# Patient Record
(demographics unavailable — no encounter records)

---

## 2024-12-19 NOTE — CARDIOLOGY SUMMARY
[de-identified] : 12/19/24: sinus rhythm [de-identified] : TTE 11/10/2024  CONCLUSIONS:  1. Normal right ventricular cavity size and probably normal right ventricular systolic function. Tricuspid annular plane systolic excursion (TAPSE) is 1.9 cm (normal >=1.7 cm).  2. Left atrium is normal in size.  3. The right atrium is normal in size.  4. No pericardial effusion seen.  5. LV/RV fuinction should be re evaluated after rhythm or rate control.  6. Analysis of left ventricular diastolic function and filling pressure is made challenging by the presence of tachycardia.  7. Left ventricular endocardium is not well visualized; however, the left ventricular systolic function appears grossly normal.  8. No prior echocardiogram is available for comparison.  9. Technically difficult image quality. 10. The inferior vena cava is normal in size measuring 1.03 cm in diameter, (normal <2.1cm) with normal inspiratory collapse (normal >50%) consistent with normal right atrial pressure (\R\3, range 0-5mmHg). 11. There is no evidence of a left ventricular thromb

## 2024-12-19 NOTE — HISTORY OF PRESENT ILLNESS
[FreeTextEntry1] : Shawn Andrews is a 39-year-old man with WPW s/p ablation (1999, ?R sided) and HTN. He presented on 11/8/24 with dizziness and lightheadedness in the setting of two weeks of symptomatic palpitations. He was found to have episodes of narrow complex tachycardia in the ED with ventricular rates in the 160s. He was initially admitted to the floors before being stepped up to the CCU after a rapid response was called for sustained tachycardia nonresponsive to beta blocker. Adenosine was administered multiple times which terminated the tachycardia with reinitiation. He was then started on a procainamide infusion and admitted to the CICU. Telemetry revealed episodes of narrow complex tachycardia that started and stopped with PVCs. Procainamide was transitioned to flecainide, which failed to suppress his arrhythmia. His TTE from 11/10/24 in tachycardia shows grossly normal LV function, normal RV size w/probably normal function, normal biatrial size, trace MR, trace TR, trace PI and no pericardial effusion. He presented to the EP lab 11/12/2024 for an EP study, during which orthodromic reciprocating tachycardia utilizing a left anterolateral pathway (2 o'clock on the mitral annulus) was induced. He underwent successful ablation of the pathway and was discharged on a 30 day course of Eliquis 5mg bid.   Since his procedure, the patient feels well. Right groin without pain, swelling, or bruising. He denies any chest pain, palpitations, SOB, dizziness, syncope or near syncope. He notes that he discovered in the hospital that the reason he'd wake up after a night of sleep tired and diaphoretic was that he would go in to tachycardia. Post procedure he has awoken in the morning refreshed without any diaphoresis.

## 2024-12-19 NOTE — CARDIOLOGY SUMMARY
[de-identified] : 12/19/24: sinus rhythm [de-identified] : TTE 11/10/2024  CONCLUSIONS:  1. Normal right ventricular cavity size and probably normal right ventricular systolic function. Tricuspid annular plane systolic excursion (TAPSE) is 1.9 cm (normal >=1.7 cm).  2. Left atrium is normal in size.  3. The right atrium is normal in size.  4. No pericardial effusion seen.  5. LV/RV fuinction should be re evaluated after rhythm or rate control.  6. Analysis of left ventricular diastolic function and filling pressure is made challenging by the presence of tachycardia.  7. Left ventricular endocardium is not well visualized; however, the left ventricular systolic function appears grossly normal.  8. No prior echocardiogram is available for comparison.  9. Technically difficult image quality. 10. The inferior vena cava is normal in size measuring 1.03 cm in diameter, (normal <2.1cm) with normal inspiratory collapse (normal >50%) consistent with normal right atrial pressure (\R\3, range 0-5mmHg). 11. There is no evidence of a left ventricular thromb

## 2024-12-19 NOTE — DISCUSSION/SUMMARY
[EKG obtained to assist in diagnosis and management of assessed problem(s)] : EKG obtained to assist in diagnosis and management of assessed problem(s) [FreeTextEntry1] : Impressions:  1. AVRT: s/p left anterolateral pathway ablation on 11/12/24.  EKG performed today to assess for presence of conduction disease and reveals sinus rhythm with no evidence of pre-excitation. Mr. Andrews is feeling well post ablation. He has completed 30 days of Eliquis 5mg bid post ablation. He does not need to continue on any antiarrhythmics or AV talat agents. No further EP testing or intervention indicated.    2.  HTN: currently is on amlodipine 10mg QD and was started on metoprolol for additional BP control. Spoke with patient about discussing with PCP about switching metoprolol to a class I antihypertensive such as lisinopril or enalapril.Encouraged heart healthy diet, sodium restriction, and weight loss. Continue regular f/u with Cardiologist for further HTN management.  Return to clinic PRN